# Patient Record
Sex: FEMALE | Employment: FULL TIME | URBAN - METROPOLITAN AREA
[De-identification: names, ages, dates, MRNs, and addresses within clinical notes are randomized per-mention and may not be internally consistent; named-entity substitution may affect disease eponyms.]

---

## 2018-07-20 ENCOUNTER — APPOINTMENT (OUTPATIENT)
Dept: GENERAL RADIOLOGY | Age: 23
End: 2018-07-20
Attending: PHYSICIAN ASSISTANT
Payer: COMMERCIAL

## 2018-07-20 ENCOUNTER — HOSPITAL ENCOUNTER (OUTPATIENT)
Age: 23
Discharge: HOME | End: 2018-07-20
Payer: COMMERCIAL

## 2018-07-20 VITALS
HEART RATE: 77 BPM | SYSTOLIC BLOOD PRESSURE: 128 MMHG | DIASTOLIC BLOOD PRESSURE: 82 MMHG | TEMPERATURE: 98 F | OXYGEN SATURATION: 100 % | RESPIRATION RATE: 18 BRPM

## 2018-07-20 DIAGNOSIS — S60.221A CONTUSION OF RIGHT HAND, INITIAL ENCOUNTER: Primary | ICD-10-CM

## 2018-07-20 PROCEDURE — 73130 X-RAY EXAM OF HAND: CPT | Performed by: PHYSICIAN ASSISTANT

## 2018-07-20 PROCEDURE — 99203 OFFICE O/P NEW LOW 30 MIN: CPT

## 2018-07-20 NOTE — ED PROVIDER NOTES
Patient Seen in: [facility]    History   Patient presents with:  Hand Pain    Stated Complaint: RIGHT HAND INJURY X MONTH     HPI    20-year-old female here with complaint of pain to her right hand at the metacarpal region ×4 we and reactive to light. Neck: Normal range of motion. Neck supple. Cardiovascular: Normal rate, regular rhythm, normal heart sounds and intact distal pulses.     Pulmonary/Chest: Effort normal and breath sounds normal.   Musculoskeletal: She exhibits ten understanding. All questions and concerns are addressed to the patients satisfaction prior to discharge today.              Disposition and Plan     Clinical Impression:  Contusion of right hand, initial encounter  (primary encounter diagnosis)    Dispositi

## 2018-07-20 NOTE — ED INITIAL ASSESSMENT (HPI)
C/O right hand pain that occurred 1 month ago. Sts that she hit it on the wall when she was doing laundry. Denies any fracture of that hand prior.

## (undated) NOTE — LETTER
Date & Time: 7/20/2018, 12:21 PM  Patient: Marilou Crabtree  Encounter Provider(s):    ABDIEL Shaw       To Whom It May Concern:    Marilou Crabtree was seen and treated in our department on 7/20/2018. She may return to work tomorrow.     I